# Patient Record
(demographics unavailable — no encounter records)

---

## 2024-10-15 NOTE — PHYSICAL EXAM
[Chaperone Present] : A chaperone was present in the examining room during all aspects of the physical examination [93083] : A chaperone was present during the pelvic exam. [Absent] : Adnexa(ae): Absent [Normal] : Recto-Vaginal Exam: Normal [de-identified] : LIZZETTE

## 2024-10-15 NOTE — REASON FOR VISIT
Patient: Aren Emanuel    Procedure(s):  LAPAROSCOPIC BILATERAL INGUINAL HERNIA REPAIR WITH MESH  OPEN UMBILICAL HERNIA REPAIR    Diagnosis:Bilateral inguinal hernia [K40.20]  Umbilical hernia [K42.9]  Diagnosis Additional Information: No value filed.    Anesthesia Type:  General    Note:  Anesthesia Post Evaluation    Patient location during evaluation: PACU  Patient participation: Able to fully participate in evaluation  Level of consciousness: awake, awake and alert and responsive to verbal stimuli  Pain management: adequate  Airway patency: patent  Cardiovascular status: acceptable  Respiratory status: acceptable  Hydration status: acceptable  PONV: none     Anesthetic complications: None          Last vitals:  Vitals:    08/26/20 1515 08/26/20 1527 08/26/20 1530   BP: 109/71  110/67   Pulse: 64  64   Resp: 16 16 12   Temp:   36.7  C (98.1  F)   SpO2: 100% 98% 97%         Electronically Signed By: Rosaura Gunn  August 26, 2020  3:59 PM  
[FreeTextEntry1] : Follow-up  58yo here for Surveillance. Patient with no complaints.  HCM:  Mammo: 24 neg Colonoscopy: 22  *Pt's   10/2023.  ObHx:  x 3 GynHx: as above  PmHx: obesity, Arthritis Sx: BTL  Meds: Vitamins Allergies: none SocialHx: , not working, no smoking, drinking or drug use FamHx:: Mother- Liver Cirrhosis   Health Maintenance  Last mammogram- 24- normal - follow up in 1 year  Last colonoscopy -2022- follow up in 5 years  
[FreeTextEntry1] : Follow-up  60yo here for Surveillance. Patient with no complaints.  HCM:  Mammo: 24 neg Colonoscopy: 22  *Pt's   10/2023.  ObHx:  x 3 GynHx: as above  PmHx: obesity, Arthritis Sx: BTL  Meds: Vitamins Allergies: none SocialHx: , not working, no smoking, drinking or drug use FamHx:: Mother- Liver Cirrhosis   Health Maintenance  Last mammogram- 24- normal - follow up in 1 year  Last colonoscopy -2022- follow up in 5 years

## 2024-10-15 NOTE — HISTORY OF PRESENT ILLNESS
[FreeTextEntry1] : Problem List A. 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  B. HER2 2+/Equivocal -- FISH Negative/ Not Amplified C. PDL1 Positive   PDL1 Positive Previous Therapy 1. TLH/BSO, SLNB 3/12/21 a. Lymph nodes, left external iliac sentinel: one lymph node positive for metastatic carcinoma (1/1) b. Lymph node, right external iliac sentinel: one lymph node positive for metastatic carcinoma (1/1) c. Uterus with cervix, bilateral tubes and ovaries - Undifferentiated malignant neoplasm (favor carcinoma) - Tumor invades 95% of myometrium - Cervix positive for tumor - LVSI identified - Right and left ovaries negative for tumor d. Lymph node right pelvic: two nodes negative for tumor (0/2) e. Lymph node left pelvic: (1/2) positive for tumor f. Omentum, no tumor  B. Breast Mass: no mass appreciated at time of biopsy, therefore no biopsy taken  C. CHEMOTHERAPY:  Carbo/taxol:  C1: 4/26 C2: 5/17 C3: 6/7/21 C4: 6/28 c5: 7/19 C6 8/9/21  D: CT A.P 8/10/21     a) No evidence of local recurrence or metastatic disease.     b) Unchanged hepatic steatosis  E. Completed 3600cGy/ 4500cGy to the pelvis 10/20/21  F. CT A/P 12/9/21-      1. No evidence of local recurrence or metastatic disease.     2.  No suspicious adenopathy.     3.  New mosaic attenuation at bilateral lung bases which is nonspecific, can be seen with air trapping related to small airways inflammation.     4.  Unchanged diffuse hepatic steatosis.  G. CT Chest- 3/9/22     a) 1. Interval resolution of previously noted lower lung zone mosaic attenuation pattern. This may represent resolved air trapping from expiratory phase of imaging versus resolved mild pulmonary venous congestion and/or air trapping     b) 4 mm nodule within the right lower lobe outside the field-of-view of prior imaging. In this patient with known history of extrathoracic neoplasm short interval surveillance thoracic CT in 3 months is suggested to confirm stability.     c) Hepatic steatosis.  H. CT Chest, A/P 10/2022    a)  No evidence of locally recurrent or metastatic disease.    b) No new or developing pulmonary nodule. Unchanged right lower lobe solid micronodule

## 2024-10-15 NOTE — PHYSICAL EXAM
[Chaperone Present] : A chaperone was present in the examining room during all aspects of the physical examination [[id number]] : A chaperone was present during the pelvic exam. [Absent] : Adnexa(ae): Absent [Normal] : Recto-Vaginal Exam: Normal [de-identified] : LIZZETTE

## 2024-10-15 NOTE — ASSESSMENT
[FreeTextEntry1] : LIZZETTE  S/S of recurrence discussed.  Importance of health maintenance discussed. She has an appointment to see Dr. Mckoy soon.  [] F/U in 3 months

## 2024-12-09 NOTE — DISCUSSION/SUMMARY
[de-identified] : Bilateral knee arthritis.  She seems to responded well to symptomatic treatments.  We reviewed those again including the importance of a home exercise program appropriate medication use with a discussion of precautions as well as use of ice and heat.  I would like to follow-up with us in 6 months but she will hesitate to contact us in the interim if there is any significant change or concern to me.  Patient is in agreement with this plan.  All questions answered.  Official  used for this visit.  I, Dr. Leos, personally performed the evaluation and management (E/M) services for this patient. That E/M includes conducting the clinically appropriate initial history &/or exam, assessing all conditions, and establishing the plan of care. Today, my LIO, [employer], was here to observe my evaluation and management service for this patient & follow plan of care established by me going forward.

## 2024-12-09 NOTE — HISTORY OF PRESENT ILLNESS
[de-identified] : ANITHA DURANT is known to me for bilateral knee osteoarthritis.  Seen with official  ([id number]). since we last saw them, she's doing better. She's been taking Meloxicam as needed and has been working with PT- she has 1 week left. She feels her knee pain and swelling has improved. Denies any fevers and chills or other signs of infection.

## 2024-12-09 NOTE — PHYSICAL EXAM
[de-identified] : General: AxO x 3  Neuro: 5/5 strength with foot eversion, foot inversion, dorsiflexion, plantar flexion, sensation is intact over the lower extremity in L2-S1 nerve distributions. 2+ dorsalis pedis and posterior tibial pulses.  Skin: no signs of infection  [de-identified] : 4 views of bilateral knees.  There is arthritis of the left greater than right knee with joint space narrowing sclerosis and small marginal osteophytes.

## 2025-02-24 NOTE — INTERPRETER SERVICES
[Language Line ] : provided by Language Line   [Interpreters_IDNumber] : [id number] [Interpreters_FullName] : Wally [TWNoteComboBox1] : Albanian

## 2025-02-24 NOTE — PHYSICAL EXAM
[No Acute Distress] : no acute distress [Well-Appearing] : well-appearing [Normal Sclera/Conjunctiva] : normal sclera/conjunctiva [Supple] : supple [Normal] : affect was normal and insight and judgment were intact [de-identified] : + b/l cerumen impaction

## 2025-02-24 NOTE — HEALTH RISK ASSESSMENT
[Never] : Never [Patient reported mammogram was normal] : Patient reported mammogram was normal [Patient reported colonoscopy was normal] : Patient reported colonoscopy was normal [0] : 2) Feeling down, depressed, or hopeless: Not at all (0) [PHQ-2 Negative - No further assessment needed] : PHQ-2 Negative - No further assessment needed [Fully functional (bathing, dressing, toileting, transferring, walking, feeding)] : Fully functional (bathing, dressing, toileting, transferring, walking, feeding) [Fully functional (using the telephone, shopping, preparing meals, housekeeping, doing laundry, using] : Fully functional and needs no help or supervision to perform IADLs (using the telephone, shopping, preparing meals, housekeeping, doing laundry, using transportation, managing medications and managing finances) [[id number]] : 0 [Reports changes in hearing] : Reports no changes in hearing [Reports changes in vision] : Reports no changes in vision [Reports changes in dental health] : Reports no changes in dental health [MammogramDate] : 4/24 [ColonoscopyDate] : 2022 [ColonoscopyComments] : due 2027

## 2025-02-24 NOTE — HEALTH RISK ASSESSMENT
[Never] : Never [Patient reported mammogram was normal] : Patient reported mammogram was normal [Patient reported colonoscopy was normal] : Patient reported colonoscopy was normal [0] : 2) Feeling down, depressed, or hopeless: Not at all (0) [PHQ-2 Negative - No further assessment needed] : PHQ-2 Negative - No further assessment needed [Fully functional (bathing, dressing, toileting, transferring, walking, feeding)] : Fully functional (bathing, dressing, toileting, transferring, walking, feeding) [Fully functional (using the telephone, shopping, preparing meals, housekeeping, doing laundry, using] : Fully functional and needs no help or supervision to perform IADLs (using the telephone, shopping, preparing meals, housekeeping, doing laundry, using transportation, managing medications and managing finances) [[id number]] : 0 [Reports changes in hearing] : Reports no changes in hearing [Reports changes in vision] : Reports no changes in vision [Reports changes in dental health] : Reports no changes in dental health [MammogramDate] : 4/24 [ColonoscopyDate] : 2022 [ColonoscopyComments] : due 2027

## 2025-02-24 NOTE — HISTORY OF PRESENT ILLNESS
[FreeTextEntry1] : annual physical [de-identified] : Pt is a 58 y/o F with PMHx of Adenocarcinoma of uterus, DM, HTN, HLD, obesity, vit D def who presents to the office today for an annual physical.  Pt states her father passed away 2 months ago. She has been coping with the death well. Does have other family and friends neear by for support.  Overall, she feels she has been doing well.  HCM - Covid vaccine: declines - PNA vaccine: needs - defers - Influenza vaccine: will get today - Shingrix vaccine: needs  - Colonoscopy: 7/7/22 - due in 5 years due to uterine CA hx - due 2027 - Mammo: 4/20/24 neg, due in April 2025 - Pap: s/p total abs hysterectomy 2021 - Ophthalmology: UTD - Dentist: UTD - Derm: needs skin screening - Diet: eats less carbs - rice once a day - Exercise: walking only

## 2025-02-24 NOTE — INTERPRETER SERVICES
[Language Line ] : provided by Language [employer]   [Interpreters_IDNumber] : [id number] [Interpreters_FullName] : Wally [TWNoteComboBox1] : Monegasque

## 2025-02-24 NOTE — END OF VISIT
[FreeTextEntry3] : I, Dr. Mckoy, personally performed the evaluation and management (E/M) services for this established patient who presents today with (a) new problem(s)/exacerbation of (an) existing condition(s).  That E/M includes conducting the examination, assessing all new/exacerbated conditions, and establishing a new plan of care.  Today, my PA, Beata Charlton, was here to observe my evaluation and management services for this new problem/exacerbated condition to be followed going forward.

## 2025-02-24 NOTE — PHYSICAL EXAM
[No Acute Distress] : no acute distress [Well-Appearing] : well-appearing [Normal Sclera/Conjunctiva] : normal sclera/conjunctiva [Supple] : supple [Normal] : affect was normal and insight and judgment were intact [de-identified] : + b/l cerumen impaction

## 2025-02-24 NOTE — HISTORY OF PRESENT ILLNESS
[FreeTextEntry1] : annual physical [de-identified] : Pt is a 60 y/o F with PMHx of Adenocarcinoma of uterus, DM, HTN, HLD, obesity, vit D def who presents to the office today for an annual physical.  Pt states her father passed away 2 months ago. She has been coping with the death well. Does have other family and friends neear by for support.  Overall, she feels she has been doing well.  HCM - Covid vaccine: declines - PNA vaccine: needs - defers - Influenza vaccine: will get today - Shingrix vaccine: needs  - Colonoscopy: 7/7/22 - due in 5 years due to uterine CA hx - due 2027 - Mammo: 4/20/24 neg, due in April 2025 - Pap: s/p total abs hysterectomy 2021 - Ophthalmology: UTD - Dentist: UTD - Derm: needs skin screening - Diet: eats less carbs - rice once a day - Exercise: walking only

## 2025-02-24 NOTE — HEALTH RISK ASSESSMENT
[Never] : Never [Patient reported mammogram was normal] : Patient reported mammogram was normal [Patient reported colonoscopy was normal] : Patient reported colonoscopy was normal [0] : 2) Feeling down, depressed, or hopeless: Not at all (0) [PHQ-2 Negative - No further assessment needed] : PHQ-2 Negative - No further assessment needed [Fully functional (bathing, dressing, toileting, transferring, walking, feeding)] : Fully functional (bathing, dressing, toileting, transferring, walking, feeding) [Fully functional (using the telephone, shopping, preparing meals, housekeeping, doing laundry, using] : Fully functional and needs no help or supervision to perform IADLs (using the telephone, shopping, preparing meals, housekeeping, doing laundry, using transportation, managing medications and managing finances) [[id number]] : 0 [Reports changes in hearing] : Reports no changes in hearing [Reports changes in vision] : Reports no changes in vision [Reports changes in dental health] : Reports no changes in dental health [MammogramDate] : 4/24 [ColonoscopyDate] : 2022 [ColonoscopyComments] : due 2027

## 2025-02-24 NOTE — INTERPRETER SERVICES
[Language Line ] : provided by Language [employer]   [Interpreters_IDNumber] : [id number] [Interpreters_FullName] : Wally [TWNoteComboBox1] : Japanese

## 2025-02-24 NOTE — HISTORY OF PRESENT ILLNESS
[FreeTextEntry1] : annual physical [de-identified] : Pt is a 58 y/o F with PMHx of Adenocarcinoma of uterus, DM, HTN, HLD, obesity, vit D def who presents to the office today for an annual physical.  Pt states her father passed away 2 months ago. She has been coping with the death well. Does have other family and friends neear by for support.  Overall, she feels she has been doing well.  HCM - Covid vaccine: declines - PNA vaccine: needs - defers - Influenza vaccine: will get today - Shingrix vaccine: needs  - Colonoscopy: 7/7/22 - due in 5 years due to uterine CA hx - due 2027 - Mammo: 4/20/24 neg, due in April 2025 - Pap: s/p total abs hysterectomy 2021 - Ophthalmology: UTD - Dentist: UTD - Derm: needs skin screening - Diet: eats less carbs - rice once a day - Exercise: walking only

## 2025-02-24 NOTE — PHYSICAL EXAM
[No Acute Distress] : no acute distress [Well-Appearing] : well-appearing [Normal Sclera/Conjunctiva] : normal sclera/conjunctiva [Supple] : supple [Normal] : affect was normal and insight and judgment were intact [de-identified] : + b/l cerumen impaction

## 2025-04-11 NOTE — ASSESSMENT
[FreeTextEntry1] : Clinically LIZZETTE Signs/symptoms of recurrence reviewed  [] Follow up in 6 months

## 2025-04-11 NOTE — PHYSICAL EXAM
[MA] : MA [Absent] : Uterus: Absent [Normal] : Anus and perineum: Normal sphincter tone, no masses, no prolapse. [FreeTextEntry2] : Rosangeles Gilbert

## 2025-04-11 NOTE — REASON FOR VISIT
[FreeTextEntry1] : Follow-up  58yo here for surveillance. Patient feels well, denies pain, bleeding, discharge. Denies urinary/bowel symptoms.  HCM:  Mammo: 25 BIRADS 1 Colonoscopy: 22  *Pt's   10/2023.  ObHx:  x 3 GynHx: as above  PmHx: obesity, Arthritis Sx: BTL  Meds: Vitamins Allergies: none SocialHx: , not working, no smoking, drinking or drug use FamHx:: Mother- Liver Cirrhosis   Health Maintenance  Last mammogram- 25 BIRADS 1 - follow up in 1 year  Last colonoscopy -2022- follow up in 5 years

## 2025-04-15 NOTE — PHYSICAL EXAM
[Alert] : alert [Oriented x 3] : ~L oriented x 3 [FreeTextEntry3] : PE:   General: well-appearing, alert, in no acute distress Full body skin exam performed examining scalp, head, face, ears, eyes, mouth, neck, chest, back, abdomen, axilla, b/l arms, b/l forearms, b/l hands, b/l fingernails, b/l thighs, b/l legs, b/l feet, b/l toenails, groin, buttocks Pertinent findings include: -scattered light brown to dark brown colored <6mm papules and macules on the trunk and extremities -brown stuck on papules, plaques on the trunk and extremities -fingernails and toenails covered with opaque nail polish, not examined

## 2025-04-15 NOTE — HISTORY OF PRESENT ILLNESS
[FreeTextEntry1] : NP - Skin check [de-identified] : Callie France 60 y/o F accompanied by daughter presents for skin check. Pt declined translation services; daughter Igor will translate.  No new, changing, growing, bleeding, concerning skin lesions noticed  hx of uterine cancer s/p hysterectomy, chemo, RT PHx of skin cancer: no FHx of skin cancer: no Hx of blistering sunburns: no Hx of tanning bed use: no Uses sunscreen regularly: no

## 2025-04-15 NOTE — ASSESSMENT
[FreeTextEntry1] : #Multiple benign nevi - chronic, stable - I discussed the chronic nature and course of the condition - Photoprotection discussed, recommend daily broad-spectrum sunscreen, SPF 30 or greater, UPF hat, clothing. - Pt educated on ABCDE of melanoma - Recommend self-skin exam and annual skin exam by MD - Pt instructed to return for new or changing lesions especially if any moles start to change, itch, or bleed   # Seborrheic keratosis, trunk/extremities   -chronic, stable -I discussed the chronic nature and course of the condition -counseled on benign nature -no treatment needed unless symptomatic  copy of today's visit note sent to pt's PCP Dr. Mckoy per her request  RTC PRN

## 2025-05-30 NOTE — DISCUSSION/SUMMARY
Presents as clean, dry, & intact, no bleeding and no hematoma. [FreeTextEntry1] : The patient has diabetes and hypercholesterolemia.  Her blood pressure is normal.  The EKG is normal.  Her last cholesterol was elevated off of medication.  She has restarted atorvastatin.  Her A1c is controlled. [EKG obtained to assist in diagnosis and management of assessed problem(s)] : EKG obtained to assist in diagnosis and management of assessed problem(s)

## 2025-05-30 NOTE — PHYSICAL EXAM
[Well Developed] : well developed [Well Nourished] : well nourished [No Acute Distress] : no acute distress [No Carotid Bruit] : no carotid bruit [Normal S1, S2] : normal S1, S2 [No Murmur] : no murmur [Clear Lung Fields] : clear lung fields [Good Air Entry] : good air entry [No Respiratory Distress] : no respiratory distress  [Normal Gait] : normal gait [Normal DP B/L] : normal DP B/L [Edema ___] : edema [unfilled] [Moves all extremities] : moves all extremities [No Focal Deficits] : no focal deficits [Normal Speech] : normal speech [Alert and Oriented] : alert and oriented [Normal memory] : normal memory [de-identified] : PT absent

## 2025-06-19 NOTE — HISTORY OF PRESENT ILLNESS
[Other: _____] : [unfilled] [FreeTextEntry1] : 4 month follow up [de-identified] : 60 y/o female presents to the clinic with her daughter for follow up visit. : Daughter  left eye infection: - went to the ED 06/16 - prescribed Abx ointment (erythromycin) but pharmacy never received it - currently taking leftover abx from ED, requesting new RX  cerumen buildup - reports debrox is not effective and making it worst - applies eardrops and lays head down for 10 seconds - requesting ear irrigation  shingles  - diagnosed in 2021 during treatment for uterine cancer - resolved after appropriate treatment - doesn't remember receiving shingles vaccine after treatment and expresses interest today  Pt. reports adherence to rybelsus and atorvastatin and reports no additional concerns at this time regarding medications.

## 2025-06-19 NOTE — END OF VISIT
[FreeTextEntry3] : I personally performed the service described in the documentation, reviewed the documentation recorded by NP student Patti Montoya  in my presence and it accurately and completely records my words and actions.

## 2025-06-19 NOTE — PHYSICAL EXAM
[No Acute Distress] : no acute distress [EOMI] : extraocular movements intact [Normal] : normal rate, regular rhythm, normal S1 and S2 and no murmur heard [No Edema] : there was no peripheral edema [de-identified] : left sclera mildly injected and erythematous

## 2025-06-19 NOTE — INTERPRETER SERVICES
[Ad Hoc ] : provided by an ad hoc  [Interpreters_Relationshiptopatient] : Daughter [TWNoteComboBox1] : Tongan

## 2025-06-20 NOTE — DISCUSSION/SUMMARY
[de-identified] : Left greater than right knee arthritis.  I had a long discussion with the patient regarding knee arthritis / degenerative disease and treatment options. We reviewed the nature and etiology of degenerative knee degenerative disease.  We discussed the spectrum of symptomatic treatments. Our discussion included the use of appropriate exercises, activity modifications, weight reduction and maintenance, appropriate medication use, use of assistive devices, role of injections and avoidance of high impact activities.  We will provide her with a prescription for anti-inflammatory but have asked her to ensure that her primary care is aware that she is taking this.  We did review the risks and benefits.  Will follow-up in 6 months but she will not hesitate to contact us in the interim for any significant change or concern.  All questions answered.

## 2025-06-20 NOTE — PHYSICAL EXAM
[de-identified] : Exam she is alert and oriented.  She has some mild antalgia in the left.  Range of motion of the knee from 0 to 120 degrees.  She is neurologically intact.

## 2025-06-20 NOTE — HISTORY OF PRESENT ILLNESS
[de-identified] : Patient is a pleasant 60-year-old was seen today with official .  She continues to have left greater than right knee pain.  Is worse with activity.  She is taking Tylenol arthritis.  She is taken meloxicam in the past and it does help.  She uses it intermittently and is interested in continuing with that.  No new trauma or injuries.  No use of assistive devices.